# Patient Record
Sex: MALE | Race: WHITE | Employment: STUDENT | URBAN - NONMETROPOLITAN AREA
[De-identification: names, ages, dates, MRNs, and addresses within clinical notes are randomized per-mention and may not be internally consistent; named-entity substitution may affect disease eponyms.]

---
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
## 2025-04-14 ENCOUNTER — HOSPITAL ENCOUNTER (OUTPATIENT)
Dept: HOSPITAL 100 - MTLAB | Age: 20
Discharge: HOME | End: 2025-04-14
Payer: COMMERCIAL

## 2025-04-14 DIAGNOSIS — R10.9: Primary | ICD-10-CM

## 2025-04-14 LAB
ALBUMIN SERPL-MCNC: 5.1 G/DL (ref 3.5–5)
ALP SERPL-CCNC: 82 U/L (ref 40–129)
ALT SERPL-CCNC: 43 U/L (ref ?–46)
ANION GAP SERPL CALC-SCNC: 12 MMOL/L (ref 5–15)
AST(SGOT): 36 U/L (ref ?–37)
BUN SERPL-MCNC: 12 MG/DL (ref 4–19)
BUN/CREAT SERPL: 9.5 RATIO (ref 10–20)
CALCIUM SERPL-MCNC: 9.8 MG/DL (ref 7.6–11)
CHLORIDE: 102 MMOL/L (ref 98–108)
CO2 BLDCV-SCNC: 24.9 MMOL/L (ref 21–32)
CRP SERPL-MCNC: < 3 MG/L (ref 0–3)
GLUCOSE SERPL-MCNC: 92 MG/DL (ref 70–99)
POTASSIUM SPEC-MCNC: 4.2 MMOL/L (ref 3.3–5.1)

## 2025-04-14 PROCEDURE — 82784 ASSAY IGA/IGD/IGG/IGM EACH: CPT

## 2025-04-14 PROCEDURE — 86255 FLUORESCENT ANTIBODY SCREEN: CPT

## 2025-04-14 PROCEDURE — 80053 COMPREHEN METABOLIC PANEL: CPT

## 2025-04-14 PROCEDURE — 36415 COLL VENOUS BLD VENIPUNCTURE: CPT

## 2025-04-14 PROCEDURE — 83516 IMMUNOASSAY NONANTIBODY: CPT

## 2025-04-14 PROCEDURE — 86140 C-REACTIVE PROTEIN: CPT

## 2025-04-16 LAB — IGA SERPL-MCNC: 208 MG/DL (ref 90–386)

## 2025-04-21 ENCOUNTER — HOSPITAL ENCOUNTER (OUTPATIENT)
Age: 20
Discharge: HOME | End: 2025-04-21
Payer: COMMERCIAL

## 2025-04-21 DIAGNOSIS — E03.9: Primary | ICD-10-CM

## 2025-04-21 PROCEDURE — 76536 US EXAM OF HEAD AND NECK: CPT

## 2025-05-20 ENCOUNTER — HOSPITAL ENCOUNTER (OUTPATIENT)
Dept: HOSPITAL 100 - MTLAB | Age: 20
Discharge: HOME | End: 2025-05-20
Payer: COMMERCIAL

## 2025-05-20 DIAGNOSIS — R10.9: Primary | ICD-10-CM

## 2025-05-20 LAB
ALBUMIN SERPL-MCNC: 4.5 G/DL (ref 3.5–5)
ALP SERPL-CCNC: 75 U/L (ref 40–129)
ALT SERPL-CCNC: 41 U/L (ref ?–46)
AST(SGOT): 39 U/L (ref ?–37)
BILIRUB DIRECT SERPL-MCNC: 0.31 MG/DL (ref 0–0.3)
GLOBULIN: 2.8 G/DL (ref 2.2–4.2)
LIPASE: 43 U/L (ref 13–75)

## 2025-05-20 PROCEDURE — 80076 HEPATIC FUNCTION PANEL: CPT

## 2025-05-20 PROCEDURE — 83690 ASSAY OF LIPASE: CPT

## 2025-05-20 PROCEDURE — 36415 COLL VENOUS BLD VENIPUNCTURE: CPT

## 2025-06-09 ENCOUNTER — HOSPITAL ENCOUNTER (OUTPATIENT)
Dept: HOSPITAL 100 - MTLAB | Age: 20
Discharge: HOME | End: 2025-06-09
Payer: COMMERCIAL

## 2025-06-09 DIAGNOSIS — E03.9: Primary | ICD-10-CM

## 2025-06-09 LAB
T3 TOTAL - TRIIODOTHYRONINE: 1.22 NG/ML (ref 0.8–2)
T4 FREE DIRECT: 0.8 NG/DL (ref 0.76–1.46)
TSH SERPL DL<=0.005 MIU/L-ACNC: 20.9 UIU/ML (ref 0.3–4.2)

## 2025-06-09 PROCEDURE — 36415 COLL VENOUS BLD VENIPUNCTURE: CPT

## 2025-06-09 PROCEDURE — 84480 ASSAY TRIIODOTHYRONINE (T3): CPT

## 2025-06-09 PROCEDURE — 84439 ASSAY OF FREE THYROXINE: CPT

## 2025-06-09 PROCEDURE — 84443 ASSAY THYROID STIM HORMONE: CPT

## 2025-06-17 ENCOUNTER — HOSPITAL ENCOUNTER (OUTPATIENT)
Dept: HOSPITAL 100 - MTLAB | Age: 20
Discharge: HOME | End: 2025-06-17
Payer: COMMERCIAL

## 2025-06-17 DIAGNOSIS — E03.9: Primary | ICD-10-CM

## 2025-06-17 DIAGNOSIS — R53.83: ICD-10-CM

## 2025-06-17 LAB
ALBUMIN SERPL-MCNC: 4.7 G/DL (ref 3.5–5)
ALBUMIN/GLOB SERPL: 1.6 RATIO (ref 0.9–2.4)
ALP SERPL-CCNC: 81 U/L (ref 40–129)
ALT SERPL-CCNC: 79 U/L (ref ?–46)
ANION GAP SERPL CALC-SCNC: 13 MMOL/L (ref 5–15)
AST(SGOT): 37 U/L (ref ?–37)
BUN SERPL-MCNC: 14 MG/DL (ref 4–19)
BUN/CREAT SERPL: 13.6 RATIO (ref 10–20)
CALCIUM SERPL-MCNC: 9.6 MG/DL (ref 7.6–11)
CHLORIDE: 105 MMOL/L (ref 98–108)
CO2 BLDCV-SCNC: 20.9 MMOL/L (ref 21–32)
CORTISOL AM: 7.26 UG/DL (ref 6.02–18.4)
CREAT SERPL-MCNC: 1.06 MG/DL (ref 0.7–1.2)
GLOBULIN: 2.9 G/DL (ref 2.2–4.2)
GLUCOSE SERPL-MCNC: 98 MG/DL (ref 70–99)
POTASSIUM SPEC-MCNC: 4.3 MMOL/L (ref 3.3–5.1)
PROTEIN, TOTAL: 7.7 G/DL (ref 5.9–8.4)
SODIUM LEVEL: 139 MMOL/L (ref 133–145)
TESTOSTERONE, TOTAL: 538 NG/DL (ref 300–1080)
TOTAL BILIRUBIN: 0.87 MG/DL (ref 0–1.3)

## 2025-06-17 PROCEDURE — 82533 TOTAL CORTISOL: CPT

## 2025-06-17 PROCEDURE — 80053 COMPREHEN METABOLIC PANEL: CPT

## 2025-06-17 PROCEDURE — 36415 COLL VENOUS BLD VENIPUNCTURE: CPT

## 2025-06-17 PROCEDURE — 84403 ASSAY OF TOTAL TESTOSTERONE: CPT

## 2025-06-17 PROCEDURE — 82024 ASSAY OF ACTH: CPT

## 2025-06-17 PROCEDURE — 82627 DEHYDROEPIANDROSTERONE: CPT

## 2025-06-18 ENCOUNTER — OFFICE VISIT (OUTPATIENT)
Dept: FAMILY MEDICINE CLINIC | Age: 20
End: 2025-06-18
Payer: COMMERCIAL

## 2025-06-18 VITALS
DIASTOLIC BLOOD PRESSURE: 80 MMHG | HEART RATE: 94 BPM | WEIGHT: 222 LBS | SYSTOLIC BLOOD PRESSURE: 120 MMHG | BODY MASS INDEX: 30.07 KG/M2 | OXYGEN SATURATION: 98 % | HEIGHT: 72 IN

## 2025-06-18 DIAGNOSIS — M54.2 NECK PAIN ON RIGHT SIDE: ICD-10-CM

## 2025-06-18 DIAGNOSIS — E06.3 HASHIMOTO'S THYROIDITIS: ICD-10-CM

## 2025-06-18 DIAGNOSIS — R07.9 LEFT-SIDED CHEST PAIN: Primary | ICD-10-CM

## 2025-06-18 DIAGNOSIS — M99.08 SOMATIC DYSFUNCTION OF RIB: ICD-10-CM

## 2025-06-18 DIAGNOSIS — R07.81 RIB PAIN ON LEFT SIDE: ICD-10-CM

## 2025-06-18 PROBLEM — G90.A POTS (POSTURAL ORTHOSTATIC TACHYCARDIA SYNDROME): Status: ACTIVE | Noted: 2024-02-01

## 2025-06-18 PROBLEM — R53.83 OTHER FATIGUE: Status: ACTIVE | Noted: 2024-02-01

## 2025-06-18 PROBLEM — R59.0 LAD (LYMPHADENOPATHY) OF RIGHT CERVICAL REGION: Status: ACTIVE | Noted: 2024-02-01

## 2025-06-18 PROBLEM — F43.22 ADJUSTMENT REACTION WITH ANXIETY: Status: ACTIVE | Noted: 2024-01-07

## 2025-06-18 PROBLEM — F90.9 ADHD (ATTENTION DEFICIT HYPERACTIVITY DISORDER): Status: ACTIVE | Noted: 2025-06-09

## 2025-06-18 PROCEDURE — 99214 OFFICE O/P EST MOD 30 MIN: CPT | Performed by: STUDENT IN AN ORGANIZED HEALTH CARE EDUCATION/TRAINING PROGRAM

## 2025-06-18 RX ORDER — PANTOPRAZOLE SODIUM 40 MG/1
TABLET, DELAYED RELEASE ORAL
COMMUNITY
Start: 2025-05-12

## 2025-06-18 RX ORDER — INDOMETHACIN 75 MG/1
75 CAPSULE, EXTENDED RELEASE ORAL 2 TIMES DAILY WITH MEALS
Qty: 60 CAPSULE | Refills: 0 | Status: SHIPPED | OUTPATIENT
Start: 2025-06-18

## 2025-06-18 RX ORDER — THYROID 90 MG/1
90 TABLET ORAL DAILY
COMMUNITY
Start: 2025-06-10 | End: 2026-06-10

## 2025-06-18 RX ORDER — HYOSCYAMINE SULFATE 0.12 MG/1
0.12 TABLET SUBLINGUAL EVERY 6 HOURS PRN
COMMUNITY
Start: 2025-04-14

## 2025-06-18 SDOH — ECONOMIC STABILITY: FOOD INSECURITY: WITHIN THE PAST 12 MONTHS, THE FOOD YOU BOUGHT JUST DIDN'T LAST AND YOU DIDN'T HAVE MONEY TO GET MORE.: NEVER TRUE

## 2025-06-18 SDOH — ECONOMIC STABILITY: FOOD INSECURITY: WITHIN THE PAST 12 MONTHS, YOU WORRIED THAT YOUR FOOD WOULD RUN OUT BEFORE YOU GOT MONEY TO BUY MORE.: NEVER TRUE

## 2025-06-18 ASSESSMENT — PATIENT HEALTH QUESTIONNAIRE - PHQ9
1. LITTLE INTEREST OR PLEASURE IN DOING THINGS: SEVERAL DAYS
SUM OF ALL RESPONSES TO PHQ QUESTIONS 1-9: 2
SUM OF ALL RESPONSES TO PHQ QUESTIONS 1-9: 2
2. FEELING DOWN, DEPRESSED OR HOPELESS: SEVERAL DAYS
SUM OF ALL RESPONSES TO PHQ QUESTIONS 1-9: 2
SUM OF ALL RESPONSES TO PHQ QUESTIONS 1-9: 2

## 2025-06-18 NOTE — PATIENT INSTRUCTIONS
SURVEY:    You may be receiving a survey from [facility] today.    You may get this in the mail, through your MyChart or in your email.     Please complete the survey to enable us to provide the highest quality of care to you and your family.    If you cannot score us as very good ( 5 Stars) on any question, please feel free to call the office to discuss how we could have made your experience exceptional.     Thank you.    Clinical Care Team:  Dr. Nagi Zaldivar, DO Mona Jj LPN    Triage:  Rox Carlson CMA    Clerical Team:  Rox Mejia

## 2025-06-18 NOTE — PROGRESS NOTES
HPI Notes    Name: Dae Matute  : 2005         Chief Complaint:     Chief Complaint   Patient presents with    Pain     Left side sharp chest and neck pain started 14 days ago.     Abdominal Pain     Patient has abdominal pain, nausea and burning sensation started 1 month ago.        History of Present Illness:      HPI    This is a 20 year old man with hypothyroidism due to Hashimoto's disease presenting to discuss sharp left sided chest pain. He notes that while he generally feels unwell due to his thyroid disease he has been having very throbbing to sharp left sided chest pain in the mid axillary line around two weeks ago. Symptoms are worse with thoracic rotation, shoulder flexion. He has been to an outside ER twice and feels that his concerns were not taken seriously. I did review documentation from that encounter and he had a healthy appearing EKG, CXR, troponin, and d-dimer.     He is also complaining of mostly right sided neck pain and tension of roughly the same duration.     Past Medical History:     Past Medical History:   Diagnosis Date    Hashimoto's thyroiditis       Reviewed all health maintenance requirements and ordered appropriate tests  Health Maintenance Due   Topic Date Due    HIV screen  Never done    Meningococcal B vaccine (1 of 2 - Standard) Never done    Hepatitis C screen  Never done    COVID-19 Vaccine (1 - 2024-25 season) Never done       Past Surgical History:     No past surgical history on file.     Medications:       Prior to Admission medications    Medication Sig Start Date End Date Taking? Authorizing Provider   thyroid (ARMOUR) 90 MG tablet Take 1 tablet by mouth daily 6/10/25 6/10/26 Yes ProviderSwathi MD   pantoprazole (PROTONIX) 40 MG tablet TAKE 1 TABLET BY MOUTH EVERY DAY BEFORE MORNING MEAL 25  Yes ProviderSwathi MD   hyoscyamine (LEVSIN/SL) 0.125 MG sublingual tablet Take 1 tablet by mouth every 6 hours as needed 25  Yes Provider

## 2025-06-19 LAB
ADRENOCORTICOTROPIC HORMONE: 20.6 PG/ML (ref 7.2–63.3)
DHEA-S SERPL-MCNC: 677 UG/DL (ref 164.3–530.5)

## 2025-07-15 DIAGNOSIS — R07.9 LEFT-SIDED CHEST PAIN: ICD-10-CM

## 2025-07-15 DIAGNOSIS — M54.2 NECK PAIN ON RIGHT SIDE: ICD-10-CM

## 2025-07-15 DIAGNOSIS — R07.81 RIB PAIN ON LEFT SIDE: ICD-10-CM

## 2025-07-15 RX ORDER — INDOMETHACIN 75 MG/1
75 CAPSULE, EXTENDED RELEASE ORAL 2 TIMES DAILY WITH MEALS
Qty: 180 CAPSULE | Refills: 0 | Status: SHIPPED | OUTPATIENT
Start: 2025-07-15 | End: 2025-10-13

## 2025-07-15 NOTE — TELEPHONE ENCOUNTER
Last OV: 6/18/2025    Next scheduled apt: Visit date not found      Surescripts requesting refill  Medication pending